# Patient Record
Sex: MALE | Race: WHITE | NOT HISPANIC OR LATINO | Employment: FULL TIME | URBAN - METROPOLITAN AREA
[De-identification: names, ages, dates, MRNs, and addresses within clinical notes are randomized per-mention and may not be internally consistent; named-entity substitution may affect disease eponyms.]

---

## 2021-08-23 ENCOUNTER — OFFICE VISIT - RIVER FALLS (OUTPATIENT)
Dept: FAMILY MEDICINE | Facility: CLINIC | Age: 51
End: 2021-08-23

## 2021-08-23 ASSESSMENT — MIFFLIN-ST. JEOR: SCORE: 1920.41

## 2022-02-11 VITALS
HEIGHT: 73 IN | BODY MASS INDEX: 29.41 KG/M2 | WEIGHT: 221.9 LBS | OXYGEN SATURATION: 97 % | DIASTOLIC BLOOD PRESSURE: 64 MMHG | SYSTOLIC BLOOD PRESSURE: 104 MMHG | HEART RATE: 66 BPM

## 2022-02-16 NOTE — NURSING NOTE
Comprehensive Intake Entered On:  8/23/2021 2:01 PM CDT    Performed On:  8/23/2021 1:57 PM CDT by Linda Cali CMA               Summary   Chief Complaint :   [facility]- Back screen and DOT drug screen   Weight Measured :   221.9 lb(Converted to: 221 lb 14 oz, 100.652 kg)    Height Measured :   73 in(Converted to: 6 ft 1 in, 185.42 cm)    Body Mass Index :   29.27 kg/m2 (HI)    Body Surface Area :   2.27 m2   Systolic Blood Pressure :   104 mmHg   Diastolic Blood Pressure :   64 mmHg   Mean Arterial Pressure :   77 mmHg   Peripheral Pulse Rate :   66 bpm   BP Site :   Right arm   BP Method :   Manual   HR Method :   Electronic   Oxygen Saturation :   97 %   Linda Cali CMA - 8/23/2021 1:57 PM CDT   Health Status   Tobacco Use? :   Never smoker   Linda Cali CMA - 8/23/2021 1:57 PM CDT   Consents   Consent for Immunization Exchange :   Consent Granted   Consent for Immunizations to Providers :   Consent Granted   Linda Cali CMA - 8/23/2021 1:57 PM CDT   Meds / Allergies   (As Of: 8/23/2021 2:01:19 PM CDT)   Allergies (Active)   No Known Medication Allergies  Estimated Onset Date:   Unspecified ; Created By:   Linda Cali CMA; Reaction Status:   Active ; Category:   Drug ; Substance:   No Known Medication Allergies ; Type:   Allergy ; Updated By:   Linda Cali CMA; Reviewed Date:   8/23/2021 1:59 PM CDT        Medication List   (As Of: 8/23/2021 2:01:19 PM CDT)   No Known Home Medications     Linda Cali CMA - 8/23/2021 1:59:48 PM           Social History   Social History   (As Of: 8/23/2021 2:01:19 PM CDT)   Tobacco:        Never (less than 100 in lifetime), Snuff   (Last Updated: 8/23/2021 2:00:01 PM CDT by Linda Cali CMA)          Electronic Cigarette/Vaping:        Electronic Cigarette Use: Never.   (Last Updated: 8/23/2021 2:00:04 PM CDT by Linda Cali CMA)

## 2022-02-16 NOTE — PROGRESS NOTES
Patient:   WILFREDO DEAN            MRN: [id number]            FIN: [id number]               Age:   51 years     Sex:  Male     :  1970   Associated Diagnoses:   Encounter for pre-employment low back evaluation screening   Author:   Mika Rashid PA-C      Visit Information      Date of Service: 2021 02:00 pm  Performing Location: [facility]  Encounter#: [id number]      Chief Complaint   2021 1:57 PM CDT    [facility]- Back screen and DOT drug screen        Subjective   Chief complaint 2021 1:57 PM CDT    [facility]- Back screen and DOT drug screen  .  Additional information Preesnts for back screening for employment..        Health Status   Allergies:    Allergic Reactions (All)  No Known Medication Allergies   Medications:  (Selected)   ,    Medications          No Known Home Medications        Objective   Vital Signs   2021 1:57 PM CDT Peripheral Pulse Rate 66 bpm    HR Method Electronic    Systolic Blood Pressure 104 mmHg    Diastolic Blood Pressure 64 mmHg    Mean Arterial Pressure 77 mmHg    BP Site Right arm    BP Method Manual    Oxygen Saturation 97 %      Measurements from flowsheet : Measurements   2021 1:57 PM CDT Height Measured - Standard 73 in    Weight Measured - Standard 221.9 lb    BSA 2.27 m2    Body Mass Index 29.27 kg/m2  HI      See back screen report filed in chart.      Impression and Plan   Assessment and Plan:       Diagnosis: Encounter for pre-employment low back evaluation screening ([id number]).